# Patient Record
Sex: FEMALE | Race: WHITE | Employment: UNEMPLOYED | URBAN - METROPOLITAN AREA
[De-identification: names, ages, dates, MRNs, and addresses within clinical notes are randomized per-mention and may not be internally consistent; named-entity substitution may affect disease eponyms.]

---

## 2021-12-15 ENCOUNTER — HOSPITAL ENCOUNTER (EMERGENCY)
Age: 61
Discharge: HOME | End: 2021-12-15
Attending: EMERGENCY MEDICINE
Payer: MEDICARE

## 2021-12-15 VITALS
OXYGEN SATURATION: 97 % | RESPIRATION RATE: 16 BRPM | BODY MASS INDEX: 32.96 KG/M2 | SYSTOLIC BLOOD PRESSURE: 113 MMHG | WEIGHT: 210 LBS | HEART RATE: 60 BPM | DIASTOLIC BLOOD PRESSURE: 75 MMHG | TEMPERATURE: 98 F | HEIGHT: 67 IN

## 2021-12-15 DIAGNOSIS — R04.0 EPISTAXIS: Primary | ICD-10-CM

## 2021-12-15 PROCEDURE — 99284 EMERGENCY DEPT VISIT MOD MDM: CPT

## 2021-12-15 RX ORDER — TOPIRAMATE 25 MG/1
25 TABLET ORAL 2 TIMES DAILY
COMMUNITY

## 2021-12-15 RX ORDER — LEVOTHYROXINE SODIUM 112 UG/1
112 TABLET ORAL DAILY
COMMUNITY

## 2021-12-15 ASSESSMENT — PAIN DESCRIPTION - LOCATION: LOCATION: HEAD

## 2021-12-15 ASSESSMENT — PAIN SCALES - GENERAL: PAINLEVEL_OUTOF10: 2

## 2021-12-15 NOTE — ED PROVIDER NOTES
Emergency Physician Note  [address] ED  [address] [facility] [place]. [address]  Dept: [phone]  Loc: [phone]  Open Note Time:  5:28 AM EST    Chief Complaint  Epistaxis (started approx 0200, was stopped upon arrival)       History of Present Illness  Mellisa Kearney is a 57 y.o. female  has a past medical history of Anxiety, Chronic bronchitis ([address] DDD (degenerative disc disease), Depression, Hyperlipidemia, Hypertension, IBS (irritable bowel syndrome), Thyroid disease, and Unspecified sleep apnea. who presents to the ED for nosebleed. Nosebleed started shortly after she went to bed, she states it was just gushing blood, she believes most of the blood came from the right nostril. She did not experience lightheadedness or shortness of breath. She became concerned because she was unable to get it under control and she just felt it was like a lot of blood. She states that she really has had nosebleeds in the past.  She does take a baby aspirin daily. She did mention that she has been experiencing some headaches and lightheadedness for several months, she has spoken to her doctor in regards to the symptoms. She does not have a headache at this time. Nosebleed was controlled by the time she arrived in the ER by ambulance. Denies fever,   chest pain, shortness of breath, cough, abdominal pain, nausea, vomiting, diarrhea, headache,   rash. No palliative/provocative factors.        Unless otherwise stated in this report or unable to obtain because of the patient's clinical or mental status as evidenced by the medical record, this patient's positive and negative responses for review of systems, constitutional, psych, eyes, ENT, cardiovascular, respiratory, gastrointestinal, neurological, genitourinary, musculoskeletal, integument systems and systems related to the presenting problem are either stated in the preceding paragraph or were not pertinent or were negative for the symptoms and/or complaints related to the medical problem. I have reviewed the following from the nursing documentation:      Prior to Admission medications    Medication Sig Start Date End Date Taking? Authorizing Provider   metoprolol tartrate (LOPRESSOR) 25 MG tablet Take 25 mg by mouth 2 times daily   Yes Historical Provider, MD   levothyroxine (SYNTHROID) 112 MCG tablet Take 112 mcg by mouth Daily   Yes Historical Provider, MD   tiZANidine HCl (ZANAFLEX PO) Take by mouth   Yes Historical Provider, MD   topiramate (TOPAMAX) 25 MG tablet Take 25 mg by mouth 2 times daily   Yes Historical Provider, MD   HYDROcodone-acetaminophen (NORCO)  MG per tablet Take 1 tablet by mouth every 6 hours as needed for Pain. Yes Historical Provider, MD   aspirin 81 MG tablet Take 81 mg by mouth daily. Yes Historical Provider, MD   UNABLE TO [facility] medicine    Historical Provider, MD       Allergies as of 12/15/2021 - Fully Reviewed 12/15/2021   Allergen Reaction Noted    Bee venom  06/19/2014    Codeine  06/19/2014    Pcn [penicillins]  06/19/2014    Peanut-containing drug products  06/19/2014    Shellfish-derived products  06/19/2014       Past Medical History:   Diagnosis Date    Anxiety     Chronic bronchitis ([address]     DDD (degenerative disc disease)     Depression     Hyperlipidemia     Hypertension     IBS (irritable bowel syndrome)     Thyroid disease     Unspecified sleep apnea         Surgical History:   Past Surgical History:   Procedure Laterality Date    BREAST SURGERY      COLONOSCOPY      DILATION AND CURETTAGE OF UTERUS      HYSTERECTOMY      LAPAROSCOPY      TUBAL LIGATION          Family History:  History reviewed. No pertinent family history.     Social History     Socioeconomic History    Marital status:      Spouse name: Not on file    Number of children: Not on file    Years of education: Not on file    Highest education level: Not on file Occupational History    Not on file   Tobacco Use    Smoking status: Current Some Day Smoker     Packs/day: 0.75    Smokeless tobacco: Not on file   Substance and Sexual Activity    Alcohol use: Yes     Comment: rare    Drug use: Not Currently    Sexual activity: Not on file   Other Topics Concern    Not on file   Social History Narrative    Not on file     Social Determinants of Health     Financial Resource Strain:     Difficulty of Paying Living Expenses: Not on file   Food Insecurity:     Worried About Running Out of Food in the Last Year: Not on file    [place] of Food in the Last Year: Not on file   Transportation Needs:     Lack of Transportation (Medical): Not on file    Lack of Transportation (Non-Medical): Not on file   Physical Activity:     Days of Exercise per Week: Not on file    Minutes of Exercise per Session: Not on file   Stress:     Feeling of Stress : Not on file   Social Connections:     Frequency of Communication with Friends and Family: Not on file    Frequency of Social Gatherings with Friends and Family: Not on file    Attends Christian Services: Not on file    Active Member of [address] [place] [address] or Organizations: Not on file    Attends Club or Organization Meetings: Not on file    Marital Status: Not on file   Intimate Partner Violence:     Fear of Current or Ex-Partner: Not on file    Emotionally Abused: Not on file    Physically Abused: Not on file    Sexually Abused: Not on file   Housing Stability:     Unable to Pay for Housing in the Last Year: Not on file    Number of [place] in the Last Year: Not on file    Unstable Housing in the Last Year: Not on file       Nursing notes reviewed.     ED Triage Vitals [12/15/21 9720]   Enc Vitals Group      /83      Pulse 56      Resp 16      Temp 98 °F (36.7 °C)      Temp Source Oral      SpO2 98 %      Weight 210 lb (95.3 kg)      Height 5' 7\" (1.702 m)      Head Circumference       Peak Flow       Pain Score Pain Loc       Pain Edu? Excl. in [address]? GENERAL:   Body mass index is 32.89 kg/m². Awake, alert. Well developed, well nourished with no apparent distress. Nontoxic-appearing, non-ill-appearing. HENT:   Normocephalic, Atraumatic, no lacerations. Inspection of the left nostril did not reveal any bleeding nor any dried blood. Inspection of the right nostril does revealed a well-formed clot on the lateral wall of the right nostril, no active bleeding. Oropharynx clear, no tonsilar inflammation, no tonsilar exudates,  no airway obstruction, moist mucous membranes. Uvula was midline and has symmetric rise. EYES:   Conjunctiva normal,   Pupils equal round and reactive to light,   Extraocular movements normal.  NECK:  Trachea is midline. No stridor. No lymphadenopathy of the anterior cervical chain  No lymphadenopathy of the posterior cervical chain. CHEST:  Regular rate and regular rhythm, no murmurs/rubs/gallops,  normal S1/S2, chest wall non-tender. LUNGS:  No respiratory distress. No abdominal retractions, no sternal retractions  Clear to auscultation bilaterally, no wheezing, no rhochi, no rales  Speaking comfortably in full sentences  ABDOMEN:  Soft, non-tender, non-distended. No guarding. No rebound. No costovertebral angle tenderness to palpation. Normal BS, no organomegaly, no abdominal masses  EXTREMITIES:  Moves extremities x4 with purpose. Normal range of motion, no edema,  No tenderness, no deformity,  distal pulses present and equal bilaterally. SKIN:  Warm, dry and intact. NEUROLOGIC:  Normal mental status. Moving all extremities to command. Alert and oriented x4  without focal motor deficit or gross sensory deficit. Normal speech.   PSYCHIATRIC:  Not anxious,  normal mood and affect,  Appropriate eye contact,  thoughts are linear and organized,  without delusions/hallucinations,  Not responding to internal stimuli,  responds appropriately to questions    LABS and DIAGNOSTIC refilled for the following medications. I counseled patient how to take these medications. Discharge Medication List as of 12/15/2021  6:30 AM          Patient had scripts discontinues for the following medications. I counseled patient to stop taking these medications. Discharge Medication List as of 12/15/2021  6:30 AM      STOP taking these medications       diclofenac (VOLTAREN) 0.1 % ophthalmic solution Comments:   Reason for Stopping:         pregabalin (LYRICA) 100 MG capsule Comments:   Reason for Stopping:         sertraline (ZOLOFT) 100 MG tablet Comments:   Reason for Stopping:         lisinopril (PRINIVIL;ZESTRIL) 10 MG tablet Comments:   Reason for Stopping:                 Disposition  At this point I do not feel the patient requires further work up and it is reasonable to discharge the patient. I had a discussion with the patient and/or their surrogate regarding diagnosis, diagnostic testing results, treatment/ plan of care, and follow up. Patient and/or companions verbalized understanding of the ED workup, any relevant findings as well as any incidental findings, and the disposition and plan. There was shared decision-making between myself as well as the patient and/or their surrogate and we are all in agreement with discharge home. Bridger Gutierrez was an opportunity for questions and all questions were answered to the best of my ability and to the satisfaction of the patient and/or patient's family. Patient agreed to follow up as recommend for further evaluation/treatment. The patient was given strict return precautions as we discussed symptoms that would necessitate return to the ED. Patient will return to ED for new/worsening symptoms. The patient verbalized their understanding and agreement with the above plan. Please refer to AVS for further details regarding discharge instructions. Pt is in stable condition upon Discharge to home. Pt was seen during the [place] 19 pandemic.  Appropr noted them